# Patient Record
Sex: MALE | Race: WHITE | Employment: OTHER | URBAN - NONMETROPOLITAN AREA
[De-identification: names, ages, dates, MRNs, and addresses within clinical notes are randomized per-mention and may not be internally consistent; named-entity substitution may affect disease eponyms.]

---

## 2024-08-19 ENCOUNTER — APPOINTMENT (OUTPATIENT)
Dept: GENERAL RADIOLOGY | Age: 69
End: 2024-08-19
Payer: OTHER GOVERNMENT

## 2024-08-19 ENCOUNTER — HOSPITAL ENCOUNTER (EMERGENCY)
Age: 69
Discharge: HOME | End: 2024-08-19
Payer: OTHER GOVERNMENT

## 2024-08-19 VITALS
HEART RATE: 83 BPM | OXYGEN SATURATION: 99 % | DIASTOLIC BLOOD PRESSURE: 103 MMHG | SYSTOLIC BLOOD PRESSURE: 145 MMHG | TEMPERATURE: 99.1 F | RESPIRATION RATE: 18 BRPM

## 2024-08-19 DIAGNOSIS — L03.012 CELLULITIS OF THUMB, LEFT: Primary | ICD-10-CM

## 2024-08-19 DIAGNOSIS — A28.0 PASTEURELLA CELLULITIS DUE TO CAT BITE: ICD-10-CM
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
## 2024-08-19 DIAGNOSIS — L03.90 PASTEURELLA CELLULITIS DUE TO CAT BITE: ICD-10-CM

## 2024-08-19 PROCEDURE — 90471 IMMUNIZATION ADMIN: CPT | Performed by: EMERGENCY MEDICINE

## 2024-08-19 PROCEDURE — 99203 OFFICE O/P NEW LOW 30 MIN: CPT

## 2024-08-19 PROCEDURE — 99213 OFFICE O/P EST LOW 20 MIN: CPT | Performed by: EMERGENCY MEDICINE

## 2024-08-19 PROCEDURE — 6360000002 HC RX W HCPCS: Performed by: EMERGENCY MEDICINE

## 2024-08-19 PROCEDURE — 73140 X-RAY EXAM OF FINGER(S): CPT

## 2024-08-19 PROCEDURE — 90715 TDAP VACCINE 7 YRS/> IM: CPT | Performed by: EMERGENCY MEDICINE

## 2024-08-19 RX ORDER — DICYCLOMINE HYDROCHLORIDE 10 MG/1
10 CAPSULE ORAL
COMMUNITY

## 2024-08-19 RX ORDER — FINASTERIDE 5 MG/1
5 TABLET, FILM COATED ORAL DAILY
COMMUNITY

## 2024-08-19 RX ORDER — UBIDECARENONE 75 MG
50 CAPSULE ORAL DAILY
COMMUNITY

## 2024-08-19 RX ORDER — VENLAFAXINE 25 MG/1
25 TABLET ORAL 3 TIMES DAILY
COMMUNITY

## 2024-08-19 RX ORDER — CELECOXIB 100 MG/1
100 CAPSULE ORAL 2 TIMES DAILY
COMMUNITY

## 2024-08-19 RX ORDER — ASPIRIN 81 MG/1
81 TABLET, CHEWABLE ORAL DAILY
COMMUNITY

## 2024-08-19 RX ORDER — MAGNESIUM 30 MG
30 TABLET ORAL 2 TIMES DAILY
COMMUNITY

## 2024-08-19 RX ORDER — ASCORBIC ACID 500 MG
500 TABLET ORAL DAILY
COMMUNITY

## 2024-08-19 RX ORDER — MULTIVIT-MIN/IRON/FOLIC ACID/K 18-600-40
CAPSULE ORAL
COMMUNITY

## 2024-08-19 RX ORDER — LOSARTAN POTASSIUM 25 MG/1
25 TABLET ORAL DAILY
COMMUNITY

## 2024-08-19 RX ORDER — TAMSULOSIN HYDROCHLORIDE 0.4 MG/1
0.4 CAPSULE ORAL DAILY
COMMUNITY

## 2024-08-19 RX ORDER — GABAPENTIN 100 MG/1
100 CAPSULE ORAL 3 TIMES DAILY
COMMUNITY

## 2024-08-19 RX ORDER — AMOXICILLIN AND CLAVULANATE POTASSIUM 875; 125 MG/1; MG/1
1 TABLET, FILM COATED ORAL 2 TIMES DAILY
Qty: 14 TABLET | Refills: 0 | Status: SHIPPED | OUTPATIENT
Start: 2024-08-19 | End: 2024-08-26

## 2024-08-19 RX ORDER — OMEPRAZOLE 10 MG/1
10 CAPSULE, DELAYED RELEASE ORAL DAILY
COMMUNITY

## 2024-08-19 RX ADMIN — TETANUS TOXOID, REDUCED DIPHTHERIA TOXOID AND ACELLULAR PERTUSSIS VACCINE, ADSORBED 0.5 ML: 5; 2.5; 8; 8; 2.5 SUSPENSION INTRAMUSCULAR at 13:41

## 2024-08-19 ASSESSMENT — PAIN - FUNCTIONAL ASSESSMENT: PAIN_FUNCTIONAL_ASSESSMENT: 0-10

## 2024-08-19 ASSESSMENT — PAIN SCALES - GENERAL: PAINLEVEL_OUTOF10: 9

## 2024-08-19 NOTE — ED NOTES
Patient in stable condition. Alert and oriented x3. Unlabored breathing present. Patient aware of plan of care. Patient discharge instructions given and explained. Follow up information instructions given. Prescription order explained to patient.  Pharmacy with patient verified. Patient agreeable to plan of care. Patient states understanding and denies any questions or concerns. Patient ambulated out of UC with no complications.        Bernice Pressley RN  08/19/24 1256    
No

## 2024-08-19 NOTE — ED TRIAGE NOTES
Pt arrival to  with complaint of getting bit by a cat to his left thumb and fell, pt states his thumb is hurting and swollen. This happened two weeks ago. Pt is breathing with ease.

## 2024-08-19 NOTE — ED PROVIDER NOTES
Take 1 capsule by mouth 3 times daily.    LIPASE-PROTEASE-AMYLASE (CREON) 6000-20658 UNITS DELAYED RELEASE CAPSULE    Take by mouth 3 times daily (with meals)    LOSARTAN (COZAAR) 25 MG TABLET    Take 1 tablet by mouth daily    MAGNESIUM 30 MG TABLET    Take 1 tablet by mouth 2 times daily    OMEPRAZOLE (PRILOSEC) 10 MG DELAYED RELEASE CAPSULE    Take 1 capsule by mouth daily    TAMSULOSIN (FLOMAX) 0.4 MG CAPSULE    Take 1 capsule by mouth daily    VENLAFAXINE (EFFEXOR) 25 MG TABLET    Take 1 tablet by mouth 3 times daily    VITAMIN B-12 (CYANOCOBALAMIN) 100 MCG TABLET    Take 0.5 tablets by mouth daily       ALLERGIES     Patient is has No Known Allergies.    Patients   Immunization History   Administered Date(s) Administered    TDaP, ADACEL (age 10y-64y), BOOSTRIX (age 10y+), IM, 0.5mL 08/19/2024       FAMILY HISTORY     Patient's family history is not on file.    SOCIAL HISTORY     Patient  reports that he has never smoked. He has never used smokeless tobacco. He reports that he does not currently use drugs. He reports that he does not drink alcohol.    PHYSICAL EXAM     ED TRIAGE VITALS  BP: (!) 145/103, Temp: 99.1 °F (37.3 °C), Pulse: 83, Respirations: 18, SpO2: 99 %,There is no height or weight on file to calculate BMI.,No LMP for male patient.    Physical Exam  Constitutional:       Appearance: He is normal weight.   Cardiovascular:      Rate and Rhythm: Normal rate.   Pulmonary:      Effort: Pulmonary effort is normal.   Musculoskeletal:        Hands:       Comments: Erythema and significant swelling to the left proximal and distal phalanx of the left thumb.  Decreased flexion.  Full extension.  Negative Kanavel sign, no tenderness along flexor or extensor tendon sheath.   Skin:     General: Skin is warm and dry.   Neurological:      Mental Status: He is alert.         DIAGNOSTIC RESULTS     Labs:No results found for this visit on 08/19/24.    IMAGING:    XR FINGER LEFT (MIN 2 VIEWS)   Final Result      No

## 2024-08-19 NOTE — DISCHARGE INSTRUCTIONS
Augmentin as prescribed    Follow-up family physician or return here if no significant improvement in 3 days.  If symptoms worsen, go to [facility]in clinic for evaluation for possible tendon sheath infection

## 2024-08-19 NOTE — DISCHARGE INSTR - COC
Catheter: {Urinary Catheter:744630766}   Colostomy/Ileostomy/Ileal Conduit: {YES / NO:}       Date of Last BM: ***  No intake or output data in the 24 hours ending 24 1402  No intake/output data recorded.    Safety Concerns:     { [facility] Safety Concerns:175368640}    Impairments/Disabilities:      { [facility] Impairments/Disabilities:887434669}    Nutrition Therapy:  Current Nutrition Therapy:   { [facility] Diet List:939161361}    Routes of Feeding: {[facility] DME Other Feedings:393765394}  Liquids: {Slp liquid thickness:21447}  Daily Fluid Restriction: {[facility] DME Yes amt example:211537316}  Last Modified Barium Swallow with Video (Video Swallowing Test): {Done Not Done Date:}    Treatments at the Time of Hospital Discharge:   Respiratory Treatments: ***  Oxygen Therapy:  {Therapy; copd oxygen:48082}  Ventilator:    { CC Vent List:622879541}    Rehab Therapies: {THERAPEUTIC INTERVENTION:6286643168}  Weight Bearing Status/Restrictions: {[facility] Weight Bearin}  Other Medical Equipment (for information only, NOT a DME order):  {EQUIPMENT:846090971}  Other Treatments: ***    Patient's personal belongings (please select all that are sent with patient):  {[facility] DME Belongings:676899429}    RN SIGNATURE:  {Esignature:632986936}    CASE MANAGEMENT/SOCIAL WORK SECTION    Inpatient Status Date: ***    Readmission Risk Assessment Score:  Readmission Risk              Risk of Unplanned Readmission:  0           Discharging to Facility/ Agency   Name:   Address:  Phone:  Fax:    Dialysis Facility (if applicable)   Name:  Address:  Dialysis Schedule:  Phone:  Fax:    / signature: {Esignature:374184883}    PHYSICIAN SECTION    Prognosis: {Prognosis:5992476623}    Condition at Discharge: { Patient Condition:152119980}    Rehab Potential (if transferring to Rehab): {Prognosis:9199457736}    Recommended Labs or Other Treatments After Discharge: ***    Physician Certification: I certify the above

## 2024-08-23 RX ORDER — DOXYCYCLINE HYCLATE 100 MG
100 TABLET ORAL 2 TIMES DAILY
Qty: 14 TABLET | Refills: 0 | Status: SHIPPED | OUTPATIENT
Start: 2024-08-23 | End: 2024-08-30

## 2024-08-23 NOTE — ED TRIAGE NOTES
Pt called in to notify of rash and itching due to prescribed medication.  TOSHIA Harper cnp notified and new script sent to [facility] pharmacy in [place].  Pt advised to stop current medication.